# Patient Record
Sex: FEMALE | Race: WHITE | NOT HISPANIC OR LATINO | URBAN - METROPOLITAN AREA
[De-identification: names, ages, dates, MRNs, and addresses within clinical notes are randomized per-mention and may not be internally consistent; named-entity substitution may affect disease eponyms.]

---

## 2017-12-10 ENCOUNTER — EMERGENCY (EMERGENCY)
Facility: HOSPITAL | Age: 72
LOS: 1 days | Discharge: HOME | End: 2017-12-10
Attending: EMERGENCY MEDICINE | Admitting: EMERGENCY MEDICINE
Payer: MEDICARE

## 2017-12-10 VITALS
SYSTOLIC BLOOD PRESSURE: 100 MMHG | OXYGEN SATURATION: 96 % | RESPIRATION RATE: 20 BRPM | DIASTOLIC BLOOD PRESSURE: 65 MMHG | HEART RATE: 54 BPM

## 2017-12-10 VITALS
DIASTOLIC BLOOD PRESSURE: 83 MMHG | OXYGEN SATURATION: 94 % | RESPIRATION RATE: 20 BRPM | TEMPERATURE: 99 F | HEART RATE: 75 BPM | SYSTOLIC BLOOD PRESSURE: 129 MMHG

## 2017-12-10 PROCEDURE — 73110 X-RAY EXAM OF WRIST: CPT

## 2017-12-10 PROCEDURE — 23650 CLTX SHO DSLC W/MNPJ WO ANES: CPT | Mod: 54

## 2017-12-10 PROCEDURE — 96374 THER/PROPH/DIAG INJ IV PUSH: CPT | Mod: XU

## 2017-12-10 PROCEDURE — 99284 EMERGENCY DEPT VISIT MOD MDM: CPT | Mod: 25

## 2017-12-10 PROCEDURE — 93010 ELECTROCARDIOGRAM REPORT: CPT | Mod: 59

## 2017-12-10 PROCEDURE — 73110 X-RAY EXAM OF WRIST: CPT | Mod: 26,RT

## 2017-12-10 PROCEDURE — 73060 X-RAY EXAM OF HUMERUS: CPT | Mod: 26,RT

## 2017-12-10 PROCEDURE — 73030 X-RAY EXAM OF SHOULDER: CPT | Mod: 26,76,RT

## 2017-12-10 PROCEDURE — 96372 THER/PROPH/DIAG INJ SC/IM: CPT | Mod: XU

## 2017-12-10 PROCEDURE — 73060 X-RAY EXAM OF HUMERUS: CPT

## 2017-12-10 PROCEDURE — 71010: CPT | Mod: 26

## 2017-12-10 PROCEDURE — 71045 X-RAY EXAM CHEST 1 VIEW: CPT

## 2017-12-10 PROCEDURE — 73030 X-RAY EXAM OF SHOULDER: CPT

## 2017-12-10 PROCEDURE — 93005 ELECTROCARDIOGRAM TRACING: CPT | Mod: XU

## 2017-12-10 PROCEDURE — 23650 CLTX SHO DSLC W/MNPJ WO ANES: CPT | Mod: RT

## 2017-12-10 RX ORDER — ACETAMINOPHEN 500 MG
1000 TABLET ORAL ONCE
Qty: 0 | Refills: 0 | Status: COMPLETED | OUTPATIENT
Start: 2017-12-10 | End: 2017-12-10

## 2017-12-10 RX ORDER — MORPHINE SULFATE 50 MG/1
4 CAPSULE, EXTENDED RELEASE ORAL ONCE
Qty: 0 | Refills: 0 | Status: DISCONTINUED | OUTPATIENT
Start: 2017-12-10 | End: 2017-12-10

## 2017-12-10 RX ORDER — ONDANSETRON 8 MG/1
4 TABLET, FILM COATED ORAL ONCE
Qty: 0 | Refills: 0 | Status: DISCONTINUED | OUTPATIENT
Start: 2017-12-10 | End: 2017-12-10

## 2017-12-10 RX ORDER — OXYCODONE HYDROCHLORIDE 5 MG/1
5 TABLET ORAL ONCE
Qty: 0 | Refills: 0 | Status: DISCONTINUED | OUTPATIENT
Start: 2017-12-10 | End: 2017-12-10

## 2017-12-10 RX ADMIN — Medication 1000 MILLIGRAM(S): at 11:25

## 2017-12-10 RX ADMIN — MORPHINE SULFATE 4 MILLIGRAM(S): 50 CAPSULE, EXTENDED RELEASE ORAL at 10:54

## 2017-12-10 RX ADMIN — OXYCODONE HYDROCHLORIDE 5 MILLIGRAM(S): 5 TABLET ORAL at 11:24

## 2017-12-10 RX ADMIN — MORPHINE SULFATE 4 MILLIGRAM(S): 50 CAPSULE, EXTENDED RELEASE ORAL at 12:43

## 2017-12-10 NOTE — ED PROVIDER NOTE - CARE PLAN
Principal Discharge DX:	Shoulder dislocation, right, initial encounter  Instructions for follow-up, activity and diet:	Follow up with your medical doctor in 2-3 days or call our clinic at [phone] and state you were seen in the Emergency Department and would like to be seen in clinic.     Take Tylenol 1 g every six hours for at least 2 days then take it as needed for pain    Drink at least 2 Liters or 64 Ounces of water each day (UNLESS you are supposed to restrict fluids or have a history of congestive heart failure (CHF)).    Return for any persistent, worsening symptoms, or ANY concerns at all.  Secondary Diagnosis:	Fall, initial encounter

## 2017-12-10 NOTE — ED ADULT NURSE NOTE - OBJECTIVE STATEMENT
Pt is a 73 yo female who slipped on ice this am while cleaning off her hear, she denies any LOC and denies hitting her, she reports falling onto her right side, she ambulated after initially but is now having pain in her right shoulder. Pt is unable to move right arm due to pain. Pt is having a productive cough x 3-5 days. Pt denies any CP or SOB no N/V/D no fever or chills, no headaches or dizziness. She has pmh of COPD, DM and HTN

## 2017-12-10 NOTE — ED PROVIDER NOTE - PLAN OF CARE
Follow up with your medical doctor in 2-3 days or call our clinic at [phone] and state you were seen in the Emergency Department and would like to be seen in clinic.     Take Tylenol 1 g every six hours for at least 2 days then take it as needed for pain    Drink at least 2 Liters or 64 Ounces of water each day (UNLESS you are supposed to restrict fluids or have a history of congestive heart failure (CHF)).    Return for any persistent, worsening symptoms, or ANY concerns at all.

## 2017-12-10 NOTE — ED PROVIDER NOTE - PHYSICAL EXAMINATION
right shoulder deformity with severe pain ? humeral head deformity, patient cannot range right arm secondary to pain,  mild pain to right wrist, no snuff box tenderness to palpation/pain on axial loading of thumb, severe distress secondary to pain, NCAT, MMM, Trachea midline, Normal conjunctiva, hacking cough noted with wet sounding character, no focal crackles, CTAB, Non-tachycardic, Normal perfusion, Soft, NTND, No rebound/guarding, No edema, No deformity of extremities, Appropriate, Cooperative, With capacity and insight, No rashes, CN grossly intact, Normal coordination, No focal motor or sensory deficits

## 2017-12-10 NOTE — ED PROVIDER NOTE - PROGRESS NOTE DETAILS
The patient was re-examined after interventions and is feeling much better.  The patient will follow up with their primary physician this week.   shoulder reduced without difficulty

## 2017-12-10 NOTE — ED PROVIDER NOTE - OBJECTIVE STATEMENT
Patient with fall at 0900 this am and reports to emergency department with chief complaint of right shoulder pain. Moderate to severe. Persistent. Not better with time. No numbness/tingling.

## 2017-12-13 ENCOUNTER — APPOINTMENT (OUTPATIENT)
Dept: ORTHOPEDIC SURGERY | Facility: CLINIC | Age: 72
End: 2017-12-13
Payer: MEDICARE

## 2017-12-13 VITALS — WEIGHT: 180 LBS | BODY MASS INDEX: 33.13 KG/M2 | HEIGHT: 62 IN

## 2017-12-13 VITALS
HEART RATE: 96 BPM | SYSTOLIC BLOOD PRESSURE: 130 MMHG | WEIGHT: 180 LBS | BODY MASS INDEX: 33.13 KG/M2 | DIASTOLIC BLOOD PRESSURE: 78 MMHG | HEIGHT: 62 IN

## 2017-12-13 DIAGNOSIS — Z78.9 OTHER SPECIFIED HEALTH STATUS: ICD-10-CM

## 2017-12-13 DIAGNOSIS — Z86.79 PERSONAL HISTORY OF OTHER DISEASES OF THE CIRCULATORY SYSTEM: ICD-10-CM

## 2017-12-13 DIAGNOSIS — E83.52 HYPERCALCEMIA: ICD-10-CM

## 2017-12-13 DIAGNOSIS — H26.9 UNSPECIFIED CATARACT: ICD-10-CM

## 2017-12-13 DIAGNOSIS — Z87.891 PERSONAL HISTORY OF NICOTINE DEPENDENCE: ICD-10-CM

## 2017-12-13 DIAGNOSIS — Z60.2 PROBLEMS RELATED TO LIVING ALONE: ICD-10-CM

## 2017-12-13 DIAGNOSIS — H53.2 DIPLOPIA: ICD-10-CM

## 2017-12-13 DIAGNOSIS — E03.2 HYPOTHYROIDISM DUE TO MEDICAMENTS AND OTHER EXOGENOUS SUBSTANCES: ICD-10-CM

## 2017-12-13 PROCEDURE — 99203 OFFICE O/P NEW LOW 30 MIN: CPT

## 2017-12-13 PROCEDURE — 73030 X-RAY EXAM OF SHOULDER: CPT | Mod: RT

## 2017-12-13 RX ORDER — LEVOTHYROXINE SODIUM 0.12 MG/1
125 TABLET ORAL
Qty: 90 | Refills: 0 | Status: ACTIVE | COMMUNITY
Start: 2017-08-04

## 2017-12-13 RX ORDER — LOSARTAN POTASSIUM 100 MG/1
TABLET, FILM COATED ORAL
Refills: 0 | Status: ACTIVE | COMMUNITY

## 2017-12-13 RX ORDER — AMOXICILLIN 875 MG/1
875 TABLET, FILM COATED ORAL
Qty: 14 | Refills: 0 | Status: ACTIVE | COMMUNITY
Start: 2017-12-08

## 2017-12-13 RX ORDER — TOBRAMYCIN AND DEXAMETHASONE 3; 1 MG/ML; MG/ML
0.3-0.1 SUSPENSION/ DROPS OPHTHALMIC
Qty: 5 | Refills: 0 | Status: ACTIVE | COMMUNITY
Start: 2017-10-27

## 2017-12-13 RX ORDER — LOSARTAN POTASSIUM AND HYDROCHLOROTHIAZIDE 12.5; 5 MG/1; MG/1
50-12.5 TABLET ORAL
Qty: 90 | Refills: 0 | Status: ACTIVE | COMMUNITY
Start: 2017-06-29

## 2017-12-13 SDOH — SOCIAL STABILITY - SOCIAL INSECURITY: PROBLEMS RELATED TO LIVING ALONE: Z60.2

## 2017-12-20 ENCOUNTER — APPOINTMENT (OUTPATIENT)
Dept: MRI IMAGING | Facility: CLINIC | Age: 72
End: 2017-12-20
Payer: MEDICARE

## 2017-12-20 ENCOUNTER — OUTPATIENT (OUTPATIENT)
Dept: OUTPATIENT SERVICES | Facility: HOSPITAL | Age: 72
LOS: 1 days | End: 2017-12-20
Payer: MEDICARE

## 2017-12-20 DIAGNOSIS — Z00.8 ENCOUNTER FOR OTHER GENERAL EXAMINATION: ICD-10-CM

## 2017-12-20 PROCEDURE — 73221 MRI JOINT UPR EXTREM W/O DYE: CPT

## 2017-12-20 PROCEDURE — 73221 MRI JOINT UPR EXTREM W/O DYE: CPT | Mod: 26,RT

## 2018-02-14 ENCOUNTER — APPOINTMENT (OUTPATIENT)
Dept: ORTHOPEDIC SURGERY | Facility: CLINIC | Age: 73
End: 2018-02-14
Payer: MEDICARE

## 2018-02-14 DIAGNOSIS — S43.016A ANTERIOR DISLOCATION OF UNSPECIFIED HUMERUS, INITIAL ENCOUNTER: ICD-10-CM

## 2018-02-14 PROCEDURE — 99214 OFFICE O/P EST MOD 30 MIN: CPT | Mod: 25

## 2018-02-14 PROCEDURE — 20610 DRAIN/INJ JOINT/BURSA W/O US: CPT | Mod: RT

## 2018-03-14 ENCOUNTER — APPOINTMENT (OUTPATIENT)
Dept: ORTHOPEDIC SURGERY | Facility: CLINIC | Age: 73
End: 2018-03-14
Payer: MEDICARE

## 2018-03-14 PROCEDURE — 99213 OFFICE O/P EST LOW 20 MIN: CPT

## 2018-05-23 ENCOUNTER — APPOINTMENT (OUTPATIENT)
Dept: ORTHOPEDIC SURGERY | Facility: CLINIC | Age: 73
End: 2018-05-23
Payer: MEDICARE

## 2018-05-23 DIAGNOSIS — Z87.39 PERSONAL HISTORY OF OTHER DISEASES OF THE MUSCULOSKELETAL SYSTEM AND CONNECTIVE TISSUE: ICD-10-CM

## 2018-05-23 PROCEDURE — 99213 OFFICE O/P EST LOW 20 MIN: CPT

## 2018-05-29 PROBLEM — Z87.39 HISTORY OF CLOSED SHOULDER DISLOCATION: Status: ACTIVE | Noted: 2018-03-14

## 2019-04-26 ENCOUNTER — EMERGENCY (EMERGENCY)
Facility: HOSPITAL | Age: 74
LOS: 1 days | Discharge: HOME | End: 2019-04-26
Attending: EMERGENCY MEDICINE
Payer: MEDICARE

## 2019-04-26 VITALS
RESPIRATION RATE: 18 BRPM | HEART RATE: 101 BPM | HEIGHT: 63 IN | SYSTOLIC BLOOD PRESSURE: 124 MMHG | WEIGHT: 179.9 LBS | TEMPERATURE: 98 F | OXYGEN SATURATION: 96 % | DIASTOLIC BLOOD PRESSURE: 81 MMHG

## 2019-04-26 PROBLEM — J44.9 CHRONIC OBSTRUCTIVE PULMONARY DISEASE, UNSPECIFIED: Chronic | Status: ACTIVE | Noted: 2017-12-10

## 2019-04-26 PROBLEM — I10 ESSENTIAL (PRIMARY) HYPERTENSION: Chronic | Status: ACTIVE | Noted: 2017-12-10

## 2019-04-26 PROCEDURE — 99283 EMERGENCY DEPT VISIT LOW MDM: CPT

## 2019-04-26 PROCEDURE — 99282 EMERGENCY DEPT VISIT SF MDM: CPT

## 2019-04-26 NOTE — ED PROVIDER NOTE - NSFOLLOWUPINSTRUCTIONS_ED_ALL_ED_FT
Follow-up as needed with the Plastic Surgeon listed below.  Return to the ER if any additional concerns.

## 2019-04-26 NOTE — ED ADULT NURSE NOTE - NSIMPLEMENTINTERV_GEN_ALL_ED
Implemented All Universal Safety Interventions:  [place] to call system. Call bell, personal items and telephone within reach. Instruct patient to call for assistance. Room bathroom lighting operational. Non-slip footwear when patient is off stretcher. Physically safe environment: no spills, clutter or unnecessary equipment. Stretcher in lowest position, wheels locked, appropriate side rails in place.

## 2019-04-26 NOTE — ED PROVIDER NOTE - OBJECTIVE STATEMENT
74 year-old female p/w tear of left earlobe.  Patient reports changing yesterday when her earing got caught in her clothes and made a rip in her earlobe.  Bleeding initially; now resolved.  Denies pain.  No other trauma/injuries.

## 2019-04-26 NOTE — ED PROVIDER NOTE - CLINICAL SUMMARY MEDICAL DECISION MAKING FREE TEXT BOX
74 year-old female p/w tear of left earlobe - fully healed; discussed outpatient follow-up with plastic surgery for cosmetic improvement (referral provided). Jocelyn: 74 year-old female p/w tear of left earlobe - fully healed; discussed outpatient follow-up with plastic surgery for cosmetic improvement (referral provided).

## 2019-04-26 NOTE — ED PROVIDER NOTE - PHYSICAL EXAMINATION
*Gen: NAD, AAO*3  *HEENT: NC/AT, MMM, TM grey/pearly b/l, airway patent, trachea midline  *CV: RRR, S1/S2 present, no murmurs  *Resp: no respiratory distress, LCTAB, no wheezing  *Abd: non-distended, soft N/Tx4, no guarding or rigidity  *Skin: no rashes, + tear noted from middle to the end of left ear lobe - two edges completely healed, no pain/swelling/redness/bleeding  ~ Fernanda Hall M.D.

## 2019-04-26 NOTE — ED ADULT NURSE NOTE - OBJECTIVE STATEMENT
c/o left earlobe tear since about 8pm last night when her earring became caught on her shirt. Patient states there was minimum bleeding a the time and she applied a bandaid. At this time, no bleeding noted. Wound edges appear healed and clean.

## 2019-06-24 NOTE — ED PROVIDER NOTE - NS_EDPROVIDERDISPOUSERTYPE_ED_A_ED
Appointment is 6-27-19.  
Please keep appointment will discuss labs at the the time of the visit
Attending Attestation (For Attendings USE Only)...

## 2019-11-25 ENCOUNTER — OUTPATIENT (OUTPATIENT)
Dept: OUTPATIENT SERVICES | Facility: HOSPITAL | Age: 74
LOS: 1 days | End: 2019-11-25
Payer: MEDICARE

## 2019-11-25 ENCOUNTER — RESULT REVIEW (OUTPATIENT)
Age: 74
End: 2019-11-25

## 2019-11-25 VITALS
DIASTOLIC BLOOD PRESSURE: 80 MMHG | OXYGEN SATURATION: 97 % | HEART RATE: 79 BPM | SYSTOLIC BLOOD PRESSURE: 152 MMHG | HEIGHT: 60.25 IN | WEIGHT: 216.05 LBS | TEMPERATURE: 98 F | RESPIRATION RATE: 18 BRPM

## 2019-11-25 DIAGNOSIS — Z90.89 ACQUIRED ABSENCE OF OTHER ORGANS: Chronic | ICD-10-CM

## 2019-11-25 DIAGNOSIS — D05.11 INTRADUCTAL CARCINOMA IN SITU OF RIGHT BREAST: ICD-10-CM

## 2019-11-25 DIAGNOSIS — E66.9 OBESITY, UNSPECIFIED: ICD-10-CM

## 2019-11-25 DIAGNOSIS — R92.8 OTHER ABNORMAL AND INCONCLUSIVE FINDINGS ON DIAGNOSTIC IMAGING OF BREAST: ICD-10-CM

## 2019-11-25 DIAGNOSIS — Z80.3 FAMILY HISTORY OF MALIGNANT NEOPLASM OF BREAST: ICD-10-CM

## 2019-11-25 DIAGNOSIS — E03.9 HYPOTHYROIDISM, UNSPECIFIED: ICD-10-CM

## 2019-11-25 DIAGNOSIS — Z17.0 ESTROGEN RECEPTOR POSITIVE STATUS [ER+]: ICD-10-CM

## 2019-11-25 DIAGNOSIS — E78.5 HYPERLIPIDEMIA, UNSPECIFIED: ICD-10-CM

## 2019-11-25 DIAGNOSIS — Z01.818 ENCOUNTER FOR OTHER PREPROCEDURAL EXAMINATION: ICD-10-CM

## 2019-11-25 DIAGNOSIS — I10 ESSENTIAL (PRIMARY) HYPERTENSION: ICD-10-CM

## 2019-11-25 DIAGNOSIS — Z98.51 TUBAL LIGATION STATUS: Chronic | ICD-10-CM

## 2019-11-25 LAB
ANION GAP SERPL CALC-SCNC: 10 MMOL/L — SIGNIFICANT CHANGE UP (ref 5–17)
BUN SERPL-MCNC: 18 MG/DL — SIGNIFICANT CHANGE UP (ref 7–23)
CALCIUM SERPL-MCNC: 9.3 MG/DL — SIGNIFICANT CHANGE UP (ref 8.4–10.5)
CHLORIDE SERPL-SCNC: 104 MMOL/L — SIGNIFICANT CHANGE UP (ref 96–108)
CO2 SERPL-SCNC: 27 MMOL/L — SIGNIFICANT CHANGE UP (ref 22–31)
CREAT SERPL-MCNC: 0.97 MG/DL — SIGNIFICANT CHANGE UP (ref 0.5–1.3)
GLUCOSE SERPL-MCNC: 82 MG/DL — SIGNIFICANT CHANGE UP (ref 70–99)
HCT VFR BLD CALC: 45 % — SIGNIFICANT CHANGE UP (ref 34.5–45)
HGB BLD-MCNC: 14.8 G/DL — SIGNIFICANT CHANGE UP (ref 11.5–15.5)
MCHC RBC-ENTMCNC: 27.9 PG — SIGNIFICANT CHANGE UP (ref 27–34)
MCHC RBC-ENTMCNC: 32.9 GM/DL — SIGNIFICANT CHANGE UP (ref 32–36)
MCV RBC AUTO: 84.9 FL — SIGNIFICANT CHANGE UP (ref 80–100)
NRBC # BLD: 0 /100 WBCS — SIGNIFICANT CHANGE UP (ref 0–0)
PLATELET # BLD AUTO: 415 K/UL — HIGH (ref 150–400)
POTASSIUM SERPL-MCNC: 3.6 MMOL/L — SIGNIFICANT CHANGE UP (ref 3.5–5.3)
POTASSIUM SERPL-SCNC: 3.6 MMOL/L — SIGNIFICANT CHANGE UP (ref 3.5–5.3)
RBC # BLD: 5.3 M/UL — HIGH (ref 3.8–5.2)
RBC # FLD: 14.1 % — SIGNIFICANT CHANGE UP (ref 10.3–14.5)
SODIUM SERPL-SCNC: 141 MMOL/L — SIGNIFICANT CHANGE UP (ref 135–145)
WBC # BLD: 9.99 K/UL — SIGNIFICANT CHANGE UP (ref 3.8–10.5)
WBC # FLD AUTO: 9.99 K/UL — SIGNIFICANT CHANGE UP (ref 3.8–10.5)

## 2019-11-25 PROCEDURE — 36415 COLL VENOUS BLD VENIPUNCTURE: CPT

## 2019-11-25 PROCEDURE — 80048 BASIC METABOLIC PNL TOTAL CA: CPT

## 2019-11-25 PROCEDURE — 93005 ELECTROCARDIOGRAM TRACING: CPT

## 2019-11-25 PROCEDURE — 85027 COMPLETE CBC AUTOMATED: CPT

## 2019-11-25 PROCEDURE — G0463: CPT

## 2019-11-25 PROCEDURE — 88321 CONSLTJ&REPRT SLD PREP ELSWR: CPT

## 2019-11-25 PROCEDURE — 93010 ELECTROCARDIOGRAM REPORT: CPT | Mod: NC

## 2019-11-25 NOTE — H&P PST ADULT - NEGATIVE BREAST SYMPTOMS
no nipple discharge R/no breast lump L/no breast tenderness R/no nipple discharge L/no breast tenderness L

## 2019-11-25 NOTE — H&P PST ADULT - NSICDXPROBLEM_GEN_ALL_CORE_FT
PROBLEM DIAGNOSES  Problem: Intraductal carcinoma in situ of right breast  Assessment and Plan: Pre-op instructions given. Pt verbalized understanding  Pepcid & Chlorhexidine wash instructions  Pending: Medical clearacnce - Abnormal EKG + requested by surgeon    Problem: Hypothyroid  Assessment and Plan: Pt instructed to take meds as prescribed    Problem: Hyperlipidemia  Assessment and Plan: Pt instructed to take meds as prescribed    Problem: Obesity  Assessment and Plan: MERT precaution - stop bang 4    Problem: Hypertension  Assessment and Plan: Pt instructed to take meds as prescribed PROBLEM DIAGNOSES  Problem: Intraductal carcinoma in situ of right breast  Assessment and Plan: Pre-op instructions given. Pt verbalized understanding  Pepcid & Chlorhexidine wash instructions  Pending: Medical clearance - Abnormal EKG + requested by surgeon    Problem: Hypothyroid  Assessment and Plan: Pt instructed to take meds as prescribed    Problem: Hyperlipidemia  Assessment and Plan: Pt instructed to take meds as prescribed    Problem: Obesity  Assessment and Plan: MERT precaution - stop bang 4    Problem: Hypertension  Assessment and Plan: Pt instructed to take meds as prescribed

## 2019-11-25 NOTE — H&P PST ADULT - ASSESSMENT
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 Baum; ROS is unchanged since medical clearance by PMD and PST H&P.  VSS, afebrile  alert, anxious, oriented x 3  HEENT: unremarkable  neck: supple  lungs: clear bilat  breast: deferred to Dr. Baum's exam  cor: [id number] @ 72; no murmur appreciated  abd: obese, +BS, soft, non-tender  ext: no C,C or E; feet warm, pulses palp; no calf tenderness; sensory/motor intact grossly; speech fluent    A: right breast cancer      HTN, hypothyroid  P: To OR as per Dr. Sarika Hdz, RPA-C

## 2019-11-25 NOTE — H&P PST ADULT - VENOUS THROMBOEMBOLISM CURRENT STATUS
(1) other risk factor (includes escalating BMI, pack-years of smoking, diabetes requiring insulin, chemotherapy, female gender and length of surgery)/(1) varicose veins/(2) malignancy (present or previous)

## 2019-11-25 NOTE — H&P PST ADULT - NSANTHOSAYNRD_GEN_A_CORE
neck 17inches/No. MERT screening performed.  STOP BANG Legend: 0-2 = LOW Risk; 3-4 = INTERMEDIATE Risk; 5-8 = HIGH Risk

## 2019-11-25 NOTE — H&P PST ADULT - NEGATIVE CARDIOVASCULAR SYMPTOMS
no palpitations/no dyspnea on exertion/no chest pain/no orthopnea/no paroxysmal nocturnal dyspnea/no claudication/no peripheral edema

## 2019-11-25 NOTE — H&P PST ADULT - HISTORY OF PRESENT ILLNESS
75yo female with medical h/o Hypothyroid and HTN, reports annual mammogram/breast US showed abnormal findings, and reports biopsy indicate Intraductal carcinoma in situ of right breast. Pt presents today for PST for Right Breast Wide Resection w/Mady  scheduled for 12/5/2019

## 2019-11-27 ENCOUNTER — OUTPATIENT (OUTPATIENT)
Dept: OUTPATIENT SERVICES | Facility: HOSPITAL | Age: 74
LOS: 1 days | End: 2019-11-27
Payer: MEDICARE

## 2019-11-27 ENCOUNTER — APPOINTMENT (OUTPATIENT)
Dept: MAMMOGRAPHY | Facility: IMAGING CENTER | Age: 74
End: 2019-11-27
Payer: MEDICARE

## 2019-11-27 DIAGNOSIS — Z98.51 TUBAL LIGATION STATUS: Chronic | ICD-10-CM

## 2019-11-27 DIAGNOSIS — Z00.8 ENCOUNTER FOR OTHER GENERAL EXAMINATION: ICD-10-CM

## 2019-11-27 DIAGNOSIS — Z90.89 ACQUIRED ABSENCE OF OTHER ORGANS: Chronic | ICD-10-CM

## 2019-11-27 PROBLEM — E66.9 OBESITY, UNSPECIFIED: Chronic | Status: ACTIVE | Noted: 2019-11-25

## 2019-11-27 PROBLEM — E03.9 HYPOTHYROIDISM, UNSPECIFIED: Chronic | Status: ACTIVE | Noted: 2019-11-25

## 2019-11-27 LAB — SURGICAL PATHOLOGY STUDY: SIGNIFICANT CHANGE UP

## 2019-11-27 PROCEDURE — 19281 PERQ DEVICE BREAST 1ST IMAG: CPT | Mod: RT

## 2019-11-27 PROCEDURE — C1739: CPT

## 2019-11-27 PROCEDURE — 19281 PERQ DEVICE BREAST 1ST IMAG: CPT

## 2019-12-04 ENCOUNTER — TRANSCRIPTION ENCOUNTER (OUTPATIENT)
Age: 74
End: 2019-12-04

## 2019-12-05 ENCOUNTER — OUTPATIENT (OUTPATIENT)
Dept: OUTPATIENT SERVICES | Facility: HOSPITAL | Age: 74
LOS: 1 days | End: 2019-12-05
Payer: MEDICARE

## 2019-12-05 ENCOUNTER — RESULT REVIEW (OUTPATIENT)
Age: 74
End: 2019-12-05

## 2019-12-05 VITALS
RESPIRATION RATE: 13 BRPM | DIASTOLIC BLOOD PRESSURE: 79 MMHG | HEART RATE: 79 BPM | OXYGEN SATURATION: 96 % | SYSTOLIC BLOOD PRESSURE: 128 MMHG | TEMPERATURE: 98 F | HEIGHT: 60.25 IN | WEIGHT: 216.05 LBS

## 2019-12-05 VITALS
TEMPERATURE: 98 F | DIASTOLIC BLOOD PRESSURE: 79 MMHG | SYSTOLIC BLOOD PRESSURE: 128 MMHG | OXYGEN SATURATION: 96 % | RESPIRATION RATE: 16 BRPM | HEART RATE: 79 BPM

## 2019-12-05 DIAGNOSIS — Z98.51 TUBAL LIGATION STATUS: Chronic | ICD-10-CM

## 2019-12-05 DIAGNOSIS — Z17.0 ESTROGEN RECEPTOR POSITIVE STATUS [ER+]: ICD-10-CM

## 2019-12-05 DIAGNOSIS — D05.11 INTRADUCTAL CARCINOMA IN SITU OF RIGHT BREAST: ICD-10-CM

## 2019-12-05 DIAGNOSIS — Z90.89 ACQUIRED ABSENCE OF OTHER ORGANS: Chronic | ICD-10-CM

## 2019-12-05 DIAGNOSIS — R92.0 MAMMOGRAPHIC MICROCALCIFICATION FOUND ON DIAGNOSTIC IMAGING OF BREAST: ICD-10-CM

## 2019-12-05 DIAGNOSIS — Z80.3 FAMILY HISTORY OF MALIGNANT NEOPLASM OF BREAST: ICD-10-CM

## 2019-12-05 PROCEDURE — 76098 X-RAY EXAM SURGICAL SPECIMEN: CPT | Mod: 26

## 2019-12-05 PROCEDURE — 76098 X-RAY EXAM SURGICAL SPECIMEN: CPT

## 2019-12-05 PROCEDURE — 19301 PARTIAL MASTECTOMY: CPT | Mod: AS,RT

## 2019-12-05 PROCEDURE — 88307 TISSUE EXAM BY PATHOLOGIST: CPT | Mod: 26

## 2019-12-05 PROCEDURE — 88307 TISSUE EXAM BY PATHOLOGIST: CPT

## 2019-12-05 PROCEDURE — 88305 TISSUE EXAM BY PATHOLOGIST: CPT

## 2019-12-05 PROCEDURE — 14301 TIS TRNFR ANY 30.1-60 SQ CM: CPT | Mod: AS

## 2019-12-05 PROCEDURE — 88305 TISSUE EXAM BY PATHOLOGIST: CPT | Mod: 26

## 2019-12-05 PROCEDURE — 19301 PARTIAL MASTECTOMY: CPT | Mod: RT

## 2019-12-05 RX ORDER — SODIUM CHLORIDE 9 MG/ML
1000 INJECTION, SOLUTION INTRAVENOUS
Refills: 0 | Status: DISCONTINUED | OUTPATIENT
Start: 2019-12-05 | End: 2019-12-05

## 2019-12-05 RX ORDER — ASPIRIN/CALCIUM CARB/MAGNESIUM 324 MG
81 TABLET ORAL
Qty: 0 | Refills: 0 | DISCHARGE

## 2019-12-05 RX ORDER — OXYCODONE HYDROCHLORIDE 5 MG/1
5 TABLET ORAL EVERY 6 HOURS
Refills: 0 | Status: DISCONTINUED | OUTPATIENT
Start: 2019-12-05 | End: 2019-12-05

## 2019-12-05 RX ORDER — ONDANSETRON 8 MG/1
4 TABLET, FILM COATED ORAL ONCE
Refills: 0 | Status: DISCONTINUED | OUTPATIENT
Start: 2019-12-05 | End: 2019-12-05

## 2019-12-05 RX ORDER — ONDANSETRON 8 MG/1
4 TABLET, FILM COATED ORAL EVERY 6 HOURS
Refills: 0 | Status: DISCONTINUED | OUTPATIENT
Start: 2019-12-05 | End: 2019-12-28

## 2019-12-05 RX ORDER — HYDROMORPHONE HYDROCHLORIDE 2 MG/ML
0.5 INJECTION INTRAMUSCULAR; INTRAVENOUS; SUBCUTANEOUS ONCE
Refills: 0 | Status: DISCONTINUED | OUTPATIENT
Start: 2019-12-05 | End: 2019-12-05

## 2019-12-05 RX ADMIN — SODIUM CHLORIDE 50 MILLILITER(S): 9 INJECTION, SOLUTION INTRAVENOUS at 06:33

## 2019-12-05 NOTE — ASU DISCHARGE PLAN (ADULT/PEDIATRIC) - NURSING INSTRUCTIONS
all discharge,safety,follow up care to MD. Follow up with MD as indicated above. Take pain medication with a food item and not on an empty stomach. Rest at home drink plenty of fluids. Eat lightly today avoid heavy and spicy foods. all discharge,safety,follow up care to MD. Follow up with MD as indicated above. Take pain medication with a food item and not on an empty stomach. Rest at home drink plenty of fluids. Eat lightly today avoid heavy and spicy foods. After 4 days you may take binderbra and dressing off. You may shower afterwards. Put bra on and binder keep on for a total of 2 weeks after surgery. Follow up with Dr Baum in 2 weeks. Resume aspirin in1 weeks.

## 2019-12-05 NOTE — ASU DISCHARGE PLAN (ADULT/PEDIATRIC) - PAIN MANAGEMENT
Prescriptions electronically submitted to pharmacy from doctor's office Prescriptions electronically submitted to pharmacy from doctor's office/Prescription given to patient/guardian

## 2019-12-05 NOTE — ASU DISCHARGE PLAN (ADULT/PEDIATRIC) - CALL YOUR DOCTOR IF YOU HAVE ANY OF THE FOLLOWING:
Wound/Surgical Site with redness, or foul smelling discharge or pus Nausea and vomiting that does not stop/Wound/Surgical Site with redness, or foul smelling discharge or pus/Swelling that gets worse/Fever greater than (need to indicate Fahrenheit or Celsius)/Inability to tolerate liquids or foods/Bleeding that does not stop/Pain not relieved by Medications

## 2019-12-05 NOTE — ASU DISCHARGE PLAN (ADULT/PEDIATRIC) - ASU DC SPECIAL INSTRUCTIONSFT
PLEASE KEEP BINDER, BRA AND DRESSING ON FOR 4 DAYS, YOU MAY SPONGE BATHE, NO SHOWER    AFTER 4 DAYS YOU MAKE TAKE BINDER, BRA AND DRESSING OFF, YOU MAY SHOWER, AFTERWARDS PLEASE PUT ON THE BRA AND BINDER BACK ON FOR A TOTAL OF TWO WEEKS AFTER SURGERY     PLEASE FOLLOW UP WITH DR. LEES IN 2 WEEKS     YOU CAN RESUME TAKING ASPIRIN IN 1 WEEK     TAKE PAIN MEDICATION AS NEEDED {"itemType":"maillistrow","tableViewId":"folderId:[id number]=;listViewType:0;viewFilter:All;focusedViewFilter:0;inboxClassificationFilter: 1;sortColumn:1;sortDirection:Descending","tableListViewType":0,"rowKeys":["[id number]="],"subjects":["[facility] Daily Brief 12-3-19"],"latestItemIds":["[id number]="],"sizes":[[id number]]}PLEASE KEEP BINDER, BRA AND DRESSING ON FOR 4 DAYS, YOU MAY SPONGE BATHE, NO SHOWER    AFTER 4 DAYS YOU MAKE TAKE BINDER, BRA AND DRESSING OFF, YOU MAY SHOWER, AFTERWARDS PLEASE PUT ON THE BRA AND BINDER BACK ON FOR A TOTAL OF TWO WEEKS AFTER SURGERY     PLEASE FOLLOW UP WITH DR. LEES IN 2 WEEKS     YOU CAN RESUME TAKING ASPIRIN IN 1 WEEK     TAKE PAIN MEDICATION AS NEEDED

## 2019-12-05 NOTE — ASU DISCHARGE PLAN (ADULT/PEDIATRIC) - CARE PROVIDER_API CALL
Ale Baum)  Surgery  [address] [facility] [address] [place] [id number]  Phone: [phone]  Fax: [phone]  Follow Up Time:

## 2019-12-06 LAB — SURGICAL PATHOLOGY STUDY: SIGNIFICANT CHANGE UP

## 2020-01-09 ENCOUNTER — TRANSCRIPTION ENCOUNTER (OUTPATIENT)
Age: 75
End: 2020-01-09

## 2020-01-10 ENCOUNTER — OUTPATIENT (OUTPATIENT)
Dept: OUTPATIENT SERVICES | Facility: HOSPITAL | Age: 75
LOS: 1 days | End: 2020-01-10
Payer: MEDICARE

## 2020-01-10 ENCOUNTER — RESULT REVIEW (OUTPATIENT)
Age: 75
End: 2020-01-10

## 2020-01-10 VITALS
OXYGEN SATURATION: 96 % | HEART RATE: 77 BPM | RESPIRATION RATE: 16 BRPM | TEMPERATURE: 97 F | SYSTOLIC BLOOD PRESSURE: 128 MMHG | DIASTOLIC BLOOD PRESSURE: 80 MMHG

## 2020-01-10 VITALS
TEMPERATURE: 98 F | SYSTOLIC BLOOD PRESSURE: 136 MMHG | OXYGEN SATURATION: 96 % | RESPIRATION RATE: 18 BRPM | WEIGHT: 216.05 LBS | HEIGHT: 60.25 IN | DIASTOLIC BLOOD PRESSURE: 83 MMHG | HEART RATE: 72 BPM

## 2020-01-10 DIAGNOSIS — R92.8 OTHER ABNORMAL AND INCONCLUSIVE FINDINGS ON DIAGNOSTIC IMAGING OF BREAST: ICD-10-CM

## 2020-01-10 DIAGNOSIS — D05.11 INTRADUCTAL CARCINOMA IN SITU OF RIGHT BREAST: ICD-10-CM

## 2020-01-10 DIAGNOSIS — Z98.51 TUBAL LIGATION STATUS: Chronic | ICD-10-CM

## 2020-01-10 DIAGNOSIS — Z90.89 ACQUIRED ABSENCE OF OTHER ORGANS: Chronic | ICD-10-CM

## 2020-01-10 DIAGNOSIS — Z17.0 ESTROGEN RECEPTOR POSITIVE STATUS [ER+]: ICD-10-CM

## 2020-01-10 PROCEDURE — 19301 PARTIAL MASTECTOMY: CPT | Mod: AS,78,RT

## 2020-01-10 PROCEDURE — 19301 PARTIAL MASTECTOMY: CPT | Mod: 58,RT

## 2020-01-10 PROCEDURE — 88307 TISSUE EXAM BY PATHOLOGIST: CPT | Mod: 26

## 2020-01-10 PROCEDURE — 88307 TISSUE EXAM BY PATHOLOGIST: CPT

## 2020-01-10 PROCEDURE — 14301 TIS TRNFR ANY 30.1-60 SQ CM: CPT | Mod: AS,78

## 2020-01-10 RX ORDER — HYDROMORPHONE HYDROCHLORIDE 2 MG/ML
0.5 INJECTION INTRAMUSCULAR; INTRAVENOUS; SUBCUTANEOUS ONCE
Refills: 0 | Status: DISCONTINUED | OUTPATIENT
Start: 2020-01-10 | End: 2020-01-10

## 2020-01-10 RX ORDER — SODIUM CHLORIDE 9 MG/ML
1000 INJECTION, SOLUTION INTRAVENOUS
Refills: 0 | Status: DISCONTINUED | OUTPATIENT
Start: 2020-01-10 | End: 2020-01-10

## 2020-01-10 RX ORDER — LEVOTHYROXINE SODIUM 125 MCG
1 TABLET ORAL
Qty: 0 | Refills: 0 | DISCHARGE

## 2020-01-10 RX ORDER — LOSARTAN POTASSIUM 100 MG/1
12.5 TABLET, FILM COATED ORAL
Qty: 0 | Refills: 0 | DISCHARGE

## 2020-01-10 RX ORDER — ASPIRIN/CALCIUM CARB/MAGNESIUM 324 MG
1 TABLET ORAL
Qty: 0 | Refills: 0 | DISCHARGE

## 2020-01-10 RX ORDER — ONDANSETRON 8 MG/1
4 TABLET, FILM COATED ORAL ONCE
Refills: 0 | Status: DISCONTINUED | OUTPATIENT
Start: 2020-01-10 | End: 2020-01-10

## 2020-01-10 RX ORDER — MULTIVIT-MIN/FERROUS GLUCONATE 9 MG/15 ML
1 LIQUID (ML) ORAL
Qty: 0 | Refills: 0 | DISCHARGE

## 2020-01-10 RX ADMIN — HYDROMORPHONE HYDROCHLORIDE 0.5 MILLIGRAM(S): 2 INJECTION INTRAMUSCULAR; INTRAVENOUS; SUBCUTANEOUS at 09:22

## 2020-01-10 RX ADMIN — HYDROMORPHONE HYDROCHLORIDE 0.5 MILLIGRAM(S): 2 INJECTION INTRAMUSCULAR; INTRAVENOUS; SUBCUTANEOUS at 09:10

## 2020-01-10 NOTE — ASU DISCHARGE PLAN (ADULT/PEDIATRIC) - ASU DC SPECIAL INSTRUCTIONSFT
Keep dressings on for 4 days.  Then remove dressings and may shower.  Then replace dressings and bra .  Wear bra day and night for 2 weeks  Drink plenty of fluids and take a stool softener to avoid constipation if taking narcotics   Follow up with Dr Baum in 2 weeks call office for appointment

## 2020-01-10 NOTE — ASU DISCHARGE PLAN (ADULT/PEDIATRIC) - CARE PROVIDER_API CALL
Ale Baum)  Surgery  [address] [facility] [address] [place] [address]  Phone: [phone]  Fax: [phone]  Follow Up Time: 2 weeks

## 2020-01-10 NOTE — ASU DISCHARGE PLAN (ADULT/PEDIATRIC) - CALL YOUR DOCTOR IF YOU HAVE ANY OF THE FOLLOWING:
Bleeding that does not stop/Pain not relieved by Medications/Fever greater than (need to indicate Fahrenheit or Celsius) Inability to tolerate liquids or foods/Bleeding that does not stop/Nausea and vomiting that does not stop/Pain not relieved by Medications/Wound/Surgical Site with redness, or foul smelling discharge or pus/Fever greater than (need to indicate Fahrenheit or Celsius)

## 2020-01-10 NOTE — ASU DISCHARGE PLAN (ADULT/PEDIATRIC) - NURSING INSTRUCTIONS
all discharge safety follow up care to MD. Review surgeons orders as documented above. Eat lightly today avoid heavy and or spicy foods. Rest at home drink plenty of fluids. Take all pain medication with a food item and not on an empty stomach.

## 2020-01-13 LAB — SURGICAL PATHOLOGY STUDY: SIGNIFICANT CHANGE UP

## 2020-01-17 RX ORDER — ASPIRIN/CALCIUM CARB/MAGNESIUM 324 MG
1 TABLET ORAL
Qty: 0 | Refills: 0 | DISCHARGE
Start: 2020-01-17

## 2021-05-26 ENCOUNTER — APPOINTMENT (OUTPATIENT)
Dept: ULTRASOUND IMAGING | Facility: CLINIC | Age: 76
End: 2021-05-26
Payer: MEDICARE

## 2021-05-26 PROCEDURE — 76856 US EXAM PELVIC COMPLETE: CPT

## 2021-05-26 PROCEDURE — 76830 TRANSVAGINAL US NON-OB: CPT

## 2022-01-26 ENCOUNTER — APPOINTMENT (OUTPATIENT)
Dept: ULTRASOUND IMAGING | Facility: CLINIC | Age: 77
End: 2022-01-26
Payer: MEDICARE

## 2022-01-26 PROCEDURE — 76830 TRANSVAGINAL US NON-OB: CPT

## 2022-01-26 PROCEDURE — 76856 US EXAM PELVIC COMPLETE: CPT

## 2023-01-19 ENCOUNTER — APPOINTMENT (OUTPATIENT)
Dept: ULTRASOUND IMAGING | Facility: CLINIC | Age: 78
End: 2023-01-19
Payer: MEDICARE

## 2023-01-19 PROCEDURE — 76856 US EXAM PELVIC COMPLETE: CPT

## 2023-01-19 PROCEDURE — 76830 TRANSVAGINAL US NON-OB: CPT
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
## 2023-12-12 NOTE — H&P PST ADULT - ANESTHESIA, PREVIOUS REACTION, PROFILE
Annual Women Exam     Chief Complaint   Patient presents with    Annual Exam     Annual with pap, review lab results. Pt would like to rule out graves disease. Pt has new intimate partner, please add in additional testing to PAP for STDs.        Antoinette Roberts is a 50 year old female who presents for her annual well woman exam with Pap smear.  Patient has given consent to record this visit for documentation in their clinical record.    HPI  Historian: Self  Due for Influenza and T-dap vaccine.    Due for colon cancer screening.     She had a new intimate partner so needs Pap smear along with STD testing.    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 She has a history of chronic hypothyroidism, her last TSH level was much improved. Denies increased Polyphagia, polydipsia, polyuria, increased heat or cold intolerance, constipation or diarrhea. She gained 4 pounds weight from last visit. She has no issues with appetite and occasionally feels tired.    She had severe hair fall in the last 2-3 weeks which got better as her stress gets relieved. She is going to consult an Ophthalmologist.    She has a scheduled mammogram appointment today.    She still has sleep difficulties and needs refills for her medication.    Her previous Urine, Bacterial Culture reported findings of small amount of occult blood, large leukocyte esterase which was done to screen for UTI. She is still having some UTI symptoms.    Her depression is well controlled under medication with plenty of interest and pleasure in doing things. Not feeling down/depressed.     Denies chest pain, shortness of breath, palpitations.    GYNE Hx:   Patient's last menstrual period was 11/30/2023 (exact date).  Menses:regular, with moderate flow   Current contraception:  intrauterine device  Sexually Active: yes  Hx STD: No  Pap Smear: Last done on Negative for intraepithelial lesion or malignancy  History of  abnormal Pap smear: No  History of abnormal mammogram: No  Family history of breast cancer: No  Family history of uterine or ovarian cancer: No    OB History   No obstetric history on file.      Past Medical History:   Diagnosis Date    Allergy     seeing dr. philippe, restarted allergy shots again    Herpes     Hypothyroidism      Past Surgical History:   Procedure Laterality Date    Breast enhancement surgery      Hematoma evacuation       Family History   Problem Relation Age of Onset    Diabetes Mother     Heart disease Mother       Social History     Tobacco Use    Smoking status: Never    Smokeless tobacco: Never   Vaping Use    Vaping Use: never used   Substance Use Topics    Alcohol use: Yes     Comment: socially    Drug use: Never      ALLERGIES:  Patient has no known allergies.     EXERCISE:  Does patient exercise? Yes  Treadmill 0 -2 days  Was counseling given: Yes     DEPRESSION:  Recent PHQ 2/9 Score    PHQ 2:  PHQ 2 Score Adult PHQ 2 Score Adult PHQ 2 Interpretation Little interest or pleasure in activity?   12/11/2023   1:33 PM 0 No further screening needed 0       PHQ 9:  PHQ 9 Score Adult PHQ 9 Score Adult PHQ 9 Interpretation   12/11/2023   1:33 PM 2 Minimal Depression        Review of Systems  Constitutional: As Per HPI.  Respiratory: As Per HPI.  Cardiovascular: As Per HPI.  Gastrointestinal: As Per HPI.  Genitourinary: As Per HPI.  Psychiatric/Behavioral: As Per HPI.      Recent PHQ 2/9 Score    PHQ 2:  PHQ 2 Score Adult PHQ 2 Score Adult PHQ 2 Interpretation Little interest or pleasure in activity?   12/11/2023   1:33 PM 0 No further screening needed 0       PHQ 9:  PHQ 9 Score Adult PHQ 9 Score Adult PHQ 9 Interpretation   12/11/2023   1:33 PM 2 Minimal Depression     PHQ-2/9 Depression Screening  Little interest or pleasure in activity?: Not at all  Feeling down, depressed or hopeless?: Not at all  Initial depression screening score:: 0  PHQ2 Interpretation: No further screening needed  Trouble  falling or staying asleep or sleeping all the time?: Several days  Feeling tired or having little energy?: Several days  Poor appetite or overeating?: Not at all  Feeling bad about yourself or that you are a failure or have let yourself or family down?: Not at all  Trouble concentrating on things such as reading the newspaper or watching TV?: Not at all  Moving or speaking slowly that other people have noticed or the opposite - being so fidgety or restless that you have been moving around a lot more than usual?: Not at all  Thoughts that you would be better off dead or of hurting yourself in some way?: Not at all  Total depressive symptoms score (PHQ9): : 2  PHQ9 Interpretation: Minimal Depression  If you reported any problems, how difficult have these problems made it to do your work, take care of things at home, or get along with other people?: Not difficult at all     Visit Vitals  /78 (BP Location: LUE - Left upper extremity, Patient Position: Sitting, Cuff Size: Regular)   Pulse 72   Resp 18   Ht 5' 4.3\"   Wt 69.6 kg (153 lb 5.3 oz)   LMP 11/30/2023 (Exact Date)   SpO2 99%   BMI 26.07 kg/m²     PHYSICAL EXAM  Physical Exam  General exam: Patient is an NAD.  A/Ox3   Skin: no rashes or suspicious skin lesions noted.  Neck: supple, no adenopathy or masses noted in neck or supraclavicular regions.  Thyroid is not enlarged.   Heart/Lungs: Chest is clear. Heart is RRR w/out murmur.    Breast Exam: breasts symmetric, no dominant or suspicious mass, no skin or nipple changes, no axillary adenopathy, and self exam is taught and encouraged bilaterally.  Abdomen: soft without masses, organomegaly or tenderness. No distension, rebound or guarding.  Gyn Exam: IUD strings are in place.  Normal external genitalia, no fullness,  (speculum and bimanual):  External Genitalia: Normal appearance. No lesions noted.  Urethral Meatus: Normal size and location, no lesions or prolapse.  Urethra: No masses, tenderness or  scarring  Bladder: No fullness, masses or tenderness  Vagina: Normal general appearance, no discharge or lesions.    Cervix: Normal appearance without lesions or discharge.  Uterus: Normal size, contour, position, mobility, and support.  No tenderness.  Adnexa/Parametria:  No masses, tenderness, organomegaly or nodularity noted.    Labs:  Results for orders placed or performed in visit on 12/11/23   POCT Urine Dip Auto   Result Value    POCT Color Yellow    POCT Appearance Clear    POCT Glucose Urine Negative    POCT Bilirubin Negative    POCT Ketones Negative    POCT Specific Gravity >= 1.030 (A)    POCT Occult Blood Trace - Intact (A)    POCT pH 5.5    POCT Protein 30 mg/dL (A)    POCT Urobilinogen 0.2    Urine Nitrite Negative    WBC (Leukocyte) Esterase POC Trace (A)      No results found for: \"THINHPVRPT\"  Office Visit on 12/11/2023   Component Date Value Ref Range Status    POCT Color 12/11/2023 Yellow   Final    POCT Appearance 12/11/2023 Clear   Final    POCT Glucose Urine 12/11/2023 Negative  Negative mg/dL Final    POCT Bilirubin 12/11/2023 Negative  Negative Final    POCT Ketones 12/11/2023 Negative  Negative, 5 mg/dL, 160 mg/dL mg/dL Final    POCT Specific Gravity 12/11/2023 >= 1.030 (A)  1.000, 1.005, 1.010, 1.015, 1.020, 1.025, 1.030, <= 1.005 Final    POCT Occult Blood 12/11/2023 Trace - Intact (A)  Negative Final    POCT pH 12/11/2023 5.5  5.0, 5.5, 6.0, 6.5, 7.0, 7.5, 8.0, 8.5 Final    POCT Protein 12/11/2023 30 mg/dL (A)  Negative mg/dL Final    POCT Urobilinogen 12/11/2023 0.2  0.2, 1.0 mg/dL Final    Urine Nitrite 12/11/2023 Negative  Negative Final    WBC (Leukocyte) Esterase POC 12/11/2023 Trace (A)  Negative Final       ASSESSMENT & PLAN  Problem List Items Addressed This Visit       Hypothyroidism     Other Visit Diagnoses       Encounter for general adult medical examination w/o abnormal findings    -  Primary    Screening for colon cancer        Relevant Orders    Occult Blood - iFOB (aka  FIT)    UTI symptoms        Relevant Orders    POCT Urine Dip Auto (Completed)    Urine, Bacterial Culture    Encounter for screening mammogram for breast cancer        Sleep difficulties        Relevant Medications    traZODone (DESYREL) 50 MG tablet    Screening for cervical cancer        Relevant Orders    Pap Test    Screen for STD (sexually transmitted disease)        Relevant Orders    Pap Test    Immunization due        Relevant Orders    INFLUENZA QUADRIVALENT SPLIT PRES FREE 0.5 ML VACC, IM (FLULAVAL,FLUARIX,FLUZONE) (Completed)    TETANUS DIPHTHERIA ACELLULAR PERTUSSIS VACC, 10+ YRS (BOOSTRIX) (Completed)          Orders Placed This Encounter    INFLUENZA QUADRIVALENT SPLIT PRES FREE 0.5 ML VACC, IM (FLULAVAL,FLUARIX,FLUZONE)    TETANUS DIPHTHERIA ACELLULAR PERTUSSIS VACC, 10+ YRS (BOOSTRIX)    Occult Blood - iFOB (aka FIT)    POCT Urine Dip Auto    Pap Test    Urine, Bacterial Culture    traZODone (DESYREL) 50 MG tablet     Plan:   Encounter for general adult medical examination w/o abnormal findings  Continue present management.    Screening for colon cancer  Ordered Occult Blood - iFOB (aka FIT); Future    UTI symptoms  Ordered and obtained Urine Dip Auto  Ordered Urine, Bacterial Culture  Continue present management.    Encounter for screening mammogram for breast cancer  Advised getting breast cancer screening every 1 year.    Acquired hypothyroidism  Continue present management.    Sleep difficulties  Refills provided Trazodone (DESYREL) 50 MG tablet; Take 1 tablet by mouth at bedtime as needed for Sleep.  Dispense: 15 tablet; Refill: 3  Continue present management.    Screening for cervical cancer/Screen for STD (sexually transmitted disease)  Ordered Pap Test    Immunization due  Ordered and administered INFLUENZA QUADRIVALENT SPLIT PRES FREE 0.5 ML VACC, IM (FLULAVAL,FLUARIX,FLUZONE)  Ordered and administered TETANUS DIPHTHERIA ACELLULAR PERTUSSIS VACC, 10+ YRS (BOOSTRIX)    Follow up as  needed    Counseled patient for healthy eating and exercise.   Pap smear done today  According to ASCCP guidelines, HPV DNA probe done as adjunct to Pap Smear in women 29yo and older.  If Pap and HPV screen are both NEG, no Pap Smear is due again for 3 years.   Safe sex practices discussed   All questions answered   No follow-ups on file.      Refer to orders.  Medical compliance with plan discussed and risks of non-compliance reviewed.  Patient education completed on disease process, etiology & prognosis.  Proper usage and side effects of medications reviewed & discussed.  Return to clinic as clinically indicated as discussed with patient who verbalized understanding of the plan and is in agreement with the plan.    ISara, have created a visit summary document based on the audio recording between Dr. Jayshree Hanna MD and this patient for the physician to review, edit as needed, and authenticate.    Creation Date: 12/12/2023               none/unknown [email or web address]

## 2024-02-09 NOTE — ASU PATIENT PROFILE, ADULT - HEALTH/HEALTHCARE ANXIETIES, PROFILE
[facility] - PODIATRY    Today's Date: 02/09/24    Patient Name: Shanthi Akers  MRN: [id number]  CSN: [id number]  PCP: Romeo Mukherjee MD,   Referring Provider: Jigna Saul,*    SUBJECTIVE     Chief Complaint   Patient presents with    Left Ankle - Establish Care     Walking into work on Monday, 2/5/24, stepped on a rock and rolled ankle   [facility] 2/7/24, xray on chart   Pt wearing cam walker      HPI: Shanthi Akers, a 63 y.o.female, presents to clinic.    Patient is a 63-year-old female presenting with left ankle pain.  Patient states that she twisted her ankle on 2/5/2024 patient states she went to urgent care and they diagnosed her with a break in her ankle.  Patient states that it has been swollen and painful.  Patient states she is here for further treatment.    Past Medical History:   Diagnosis Date    Ankle pain, left     Asthma     Broken bones     Chronic allergic rhinitis     Depression with anxiety     Essential hypertension     GERD (gastroesophageal reflux disease)     Hyperlipidemia     Migraine headache     Numbness and tingling of leg     right thigh    Psychiatric disorder     Seasonal allergies     Shortness of breath     Sinus trouble      Past Surgical History:   Procedure Laterality Date    BACK SURGERY      COLON SURGERY      COLONOSCOPY N/A 04/12/2022    Procedure: COLONOSCOPY;  Surgeon: Ishmael Ramirez MD;  Location: [facility] ENDOSCOPY;  Service: General;  Laterality: N/A;  NORMAL COLONOSCOPY    ENDOMETRIAL ABLATION      ENDOSCOPY      ENDOSCOPY N/A 04/12/2022    Procedure: ESOPHAGOGASTRODUODENOSCOPY WITH BIOPSIES;  Surgeon: Ishmael Ramirez MD;  Location: [facility] ENDOSCOPY;  Service: General;  Laterality: N/A;  HISTORY CONTEH'S ESOPHAGUS    HAMMER TOE REPAIR Left     CHI'S NEUROMA EXCISION Right      Family History   Problem Relation Age of Onset    Heart failure Mother     Hyperlipidemia Mother     Hypertension Mother     Rheumatic fever Mother     Stroke  Mother     Heart attack Mother     Melanoma Brother     Malig Hyperthermia Neg Hx      Social History     Socioeconomic History    Marital status:    Tobacco Use    Smoking status: Never     Passive exposure: Never    Smokeless tobacco: Never   Vaping Use    Vaping Use: Never used   Substance and Sexual Activity    Alcohol use: Not Currently     Comment: occ wine    Drug use: Never    Sexual activity: Defer     Allergies   Allergen Reactions    Metronidazole Hives and Itching    Tetracycline Other (See Comments) and Rash     Skin peeling on palms and feet       Current Outpatient Medications   Medication Sig Dispense Refill    Advair Diskus 500-50 MCG/ACT DISKUS INHALE 1 PUFF TWICE A DAY 60 each 0    albuterol (ACCUNEB) 1.25 MG/3ML nebulizer solution Take 3 mL by nebulization Every 4 (Four) Hours As Needed for Wheezing or Shortness of Air (And cough). 20 each 0    aspirin 81 MG EC tablet Take 1 tablet by mouth Daily.      atorvastatin (LIPITOR) 40 MG tablet TAKE 1 TABLET BY MOUTH EVERY DAY 90 tablet 1    diclofenac (VOLTAREN) 75 MG EC tablet Take 1 tablet by mouth 2 (Two) Times a Day As Needed (pain). 20 tablet 0    losartan (COZAAR) 50 MG tablet       montelukast (SINGULAIR) 10 MG tablet TAKE 1 TABLET BY MOUTH DAILY 30 tablet 2    omeprazole (priLOSEC) 40 MG capsule TAKE 1 CAPSULE BY MOUTH EVERY DAY IN THE MORNING BEFORE BREAKFAST 90 capsule 1    PARoxetine (PAXIL) 40 MG tablet Take 1 tablet by mouth Every Morning. 90 tablet 1    Prenatal Multivit-Min-Fe-FA (Prenatal Forte) tablet Take 1 tablet by mouth Daily.      ProAir  (90 Base) MCG/ACT inhaler INHALE 2 PUFFS EVERY 4 (FOUR) HOURS AS NEEDED FOR WHEEZING. 8.5 g 2    rizatriptan (MAXALT) 10 MG tablet TAKE 1 TABLET BY MOUTH 1 (ONE) TIME AS NEEDED FOR MIGRAINE FOR UP TO 1 DOSE. MAY REPEAT IN 2 HOURS IF NEEDED 9 tablet 2    VITAMIN D, CHOLECALCIFEROL, PO Take  by mouth.       No current facility-administered medications for this visit.     Review of  Systems   Constitutional: Negative.    HENT: Negative.     Eyes: Negative.    Respiratory: Negative.     Cardiovascular: Negative.    Gastrointestinal: Negative.    Endocrine: Negative.    Genitourinary: Negative.    Musculoskeletal: Negative.         Left ankle pain   Skin: Negative.    Allergic/Immunologic: Negative.    Neurological: Negative.    Hematological: Negative.    Psychiatric/Behavioral: Negative.     All other systems reviewed and are negative.      OBJECTIVE     Vitals:    02/08/24 0952   BP: 125/79   Pulse: 113   Temp: 97.7 °F (36.5 °C)   SpO2: 94%       WBC   Date Value Ref Range Status   06/06/2023 9.04 3.40 - 10.80 10*3/mm3 Final     RBC   Date Value Ref Range Status   06/06/2023 3.85 3.77 - 5.28 10*6/mm3 Final     Hemoglobin   Date Value Ref Range Status   06/06/2023 11.2 (L) 12.0 - 15.9 g/dL Final     Hematocrit   Date Value Ref Range Status   06/06/2023 33.3 (L) 34.0 - 46.6 % Final     MCV   Date Value Ref Range Status   06/06/2023 86.5 79.0 - 97.0 fL Final     MCH   Date Value Ref Range Status   06/06/2023 29.1 26.6 - 33.0 pg Final     MCHC   Date Value Ref Range Status   06/06/2023 33.6 31.5 - 35.7 g/dL Final     RDW   Date Value Ref Range Status   06/06/2023 13.5 12.3 - 15.4 % Final     RDW-SD   Date Value Ref Range Status   06/06/2023 41.2 37.0 - 54.0 fl Final     MPV   Date Value Ref Range Status   06/06/2023 9.5 6.0 - 12.0 fL Final     Platelets   Date Value Ref Range Status   06/06/2023 354 140 - 450 10*3/mm3 Final     Neutrophil %   Date Value Ref Range Status   06/06/2023 77.1 (H) 42.7 - 76.0 % Final     Lymphocyte %   Date Value Ref Range Status   06/06/2023 15.4 (L) 19.6 - 45.3 % Final     Monocyte %   Date Value Ref Range Status   06/06/2023 4.9 (L) 5.0 - 12.0 % Final     Eosinophil %   Date Value Ref Range Status   06/06/2023 1.3 0.3 - 6.2 % Final     Basophil %   Date Value Ref Range Status   06/06/2023 0.6 0.0 - 1.5 % Final     Immature Grans %   Date Value Ref Range Status    06/06/2023 0.7 (H) 0.0 - 0.5 % Final     Neutrophils Absolute   Date Value Ref Range Status   06/16/2023 4.8 1.7 - 6.0 x10(3)/ul Final     Lymphocytes, Absolute   Date Value Ref Range Status   06/06/2023 1.39 0.70 - 3.10 10*3/mm3 Final     Monocytes, Absolute   Date Value Ref Range Status   06/06/2023 0.44 0.10 - 0.90 10*3/mm3 Final     Eosinophils Absolute   Date Value Ref Range Status   06/16/2023 0.2 0.0 - 0.6 x10(3)/ul Final     Basophils Absolute   Date Value Ref Range Status   06/16/2023 0.1 0.0 - 0.3 x10(3)/ul Final     Immature Grans, Absolute   Date Value Ref Range Status   06/06/2023 0.06 (H) 0.00 - 0.05 10*3/mm3 Final     nRBC   Date Value Ref Range Status   06/06/2023 0.1 0.0 - 0.2 /100 WBC Final         Lab Results   Component Value Date    GLUCOSE 118 (H) 06/06/2023    BUN 15 06/06/2023    CREATININE 0.88 06/06/2023    EGFRIFNONA 54 (L) 12/28/2021    BCR 17.0 06/06/2023    K 4.1 06/06/2023    CO2 25.0 06/06/2023    CALCIUM 8.9 06/06/2023    ALBUMIN 4.00 10/19/2022    LABIL2 1.1 (L) 04/19/2021    AST 61 (H) 10/19/2022    ALT 62 (H) 10/19/2022       Patient seen in no apparent distress.      PHYSICAL EXAM:     Foot/Ankle Exam    GENERAL  Appearance:  appears stated age  Orientation:  AAOx3  Affect:  appropriate  Gait:  unimpaired  Assistance:  independent  Right shoe gear: casual shoe  Left shoe gear: casual shoe    VASCULAR     Right Foot Vascularity   Normal vascular exam    Dorsalis pedis:  2+  Posterior tibial:  2+  Skin temperature:  warm  Edema grading:  None  CFT:  < 3 seconds  Pedal hair growth:  Present  Varicosities:  none     Left Foot Vascularity   Normal vascular exam    Dorsalis pedis:  2+  Posterior tibial:  2+  Skin temperature:  warm  Edema grading:  None  CFT:  < 3 seconds  Pedal hair growth:  Present  Varicosities:  none     NEUROLOGIC     Right Foot Neurologic   Normal sensation    Light touch sensation: normal  Vibratory sensation: normal  Hot/Cold sensation: normal  Protective  Sensation using Yolyn-Vahe Monofilament:   Sites intact: 10  Sites tested: 10     Left Foot Neurologic   Normal sensation    Light touch sensation: normal  Vibratory sensation: normal  Hot/Cold sensation:  normal  Protective Sensation using Yolyn-Vahe Monofilament:   Sites intact: 10  Sites tested: 10    MUSCULOSKELETAL     Left Foot Musculoskeletal   Tenderness:  anterior talofibular ligament tenderness and lateral malleolus tenderness    MUSCLE STRENGTH     Right Foot Muscle Strength   Foot dorsiflexion:  4  Foot plantar flexion:  4  Foot inversion:  4  Foot eversion:  4     Left Foot Muscle Strength   Foot dorsiflexion:  4  Foot plantar flexion:  4  Foot inversion:  4  Foot eversion:  4    RANGE OF MOTION     Right Foot Range of Motion   Foot and ankle ROM within normal limits       Left Foot Range of Motion   Foot and ankle ROM within normal limits    Ankle dorsiflexion: with pain  Ankle plantar flexion: with pain  Foot eversion: with pain  Foot inversion: with pain    DERMATOLOGIC      Right Foot Dermatologic   Skin  Right foot skin is intact.      Left Foot Dermatologic   Skin  Left foot skin is intact.       RADIOLOGY:          XR Foot 3+ View Left    Result Date: 2/7/2024  Narrative: PROCEDURE: XR FOOT 3+ VW LEFT  COMPARISON: None  INDICATIONS: Left foot pain, swelling and bruising after fall on 2/5/24.  FINDINGS:  No fractures are visualized.  Mild degenerative change consistent with osteoarthritis is seen in the tarsal region and 1st MTP joint.  Healed fracture of the distal 2nd metatarsal is evident.  Screws are seen in the distal 3rd and 4th metatarsals.  No heel spurs are seen.  No foreign body is evident.      Impression:   Left foot series demonstrating no acute bony abnormality.      MARGA RUBI MD       Electronically Signed and Approved By: MARGA RUBI MD on 2/07/2024 at 20:21             XR Ankle 3+ View Left    Result Date: 2/7/2024  Narrative: PROCEDURE: XR ANKLE 3+ VW LEFT   COMPARISON: None  INDICATIONS: Left ankle pain, swelling and bruising after fall on 2/5/24.  FINDINGS:  Nondisplaced fracture of the tip of the lateral malleolus is evident.  Soft tissue swelling is evident.  Fluid or blood is seen in the tibiotalar joint.  Mild degenerative change consistent with osteoarthritis is seen in the tibiotalar joint.      Impression:   Left ankle series demonstrating nondisplaced fracture of the tip of the lateral malleolus.  Soft tissue swelling and joint fluid or blood.      MARGA RUBI MD       Electronically Signed and Approved By: MARGA RUBI MD on 2/07/2024 at 20:20              ASSESSMENT/PLAN     Diagnoses and all orders for this visit:    1. Closed nondisplaced fracture of lateral malleolus of left fibula, initial encounter (Primary)    2. Rupture of ligament of ankle, left, initial encounter      Partial weightbearing in boot.    Patient to begin stretching exercises and icing in the evening as tolerated. Discussed compression therapy and resting the extremity.  Anti-inflammatory medication to begin taking if okay by PCP.    Return to clinic in 1 month.    Comprehensive lower extremity examination and evaluation was performed.    Discussed findings and treatment plan including risks, benefits, and treatment options with patient in detail. Patient agreed with treatment plan.    Medications and allergies reviewed.  Reviewed available lab values along with other pertinent labs.  These were discussed with the patient.    An After Visit Summary was printed and given to the patient at discharge, including (if requested) any available informative/educational handouts regarding diagnosis, treatment, or medications. All questions were answered to patient/family satisfaction. Should symptoms fail to improve or worsen they agree to call or return to clinic or to go to the Emergency Department. Discussed the importance of following up with any needed screening tests/labs/specialist  appointments and any requested follow-up recommended by me today. Importance of maintaining follow-up discussed and patient accepts that missed appointments can delay diagnosis and potentially lead to worsening of conditions.    Return in about 1 month (around 3/8/2024)., or sooner if acute issues arise.    This document has been electronically signed by Quan Chahal DPM on February 9, 2024 07:26 EST         "surgery today anxiety"

## 2024-02-26 ENCOUNTER — APPOINTMENT (OUTPATIENT)
Dept: ULTRASOUND IMAGING | Facility: CLINIC | Age: 79
End: 2024-02-26
Payer: MEDICARE

## 2024-02-26 PROCEDURE — 76830 TRANSVAGINAL US NON-OB: CPT

## 2025-01-06 NOTE — BRIEF OPERATIVE NOTE - NSICDXBRIEFPREOP_GEN_ALL_CORE_FT
PHYSICAL / OCCUPATIONAL THERAPY - DAILY TREATMENT NOTE (updated )    Patient Name: Temo Churchill    Date: 2025    : 1972  Ins[facility] / Plan: [facility] CARDINAL CARE / Product Type: *No Product type* /      Patient  verified Yes     Visit #   Current / Total 2 10   Time   In / Out 12:20 1:00   Pain   In / Out 5/10 R knee 0/10   Subjective Functional Status/Changes: \"I still can't get a shoe on my right foot.\"     TREATMENT AREA =  Gait instability [R26.81]    OBJECTIVE      Therapeutic Procedures:  Tx Min  Procedure, Rationale, Specifics   16  [id number] Therapeutic Exercise (timed):  increase ROM, strength, coordination, balance, and proprioception to improve patient's ability to progress to PLOF and address remaining functional goals. (see flow sheet as applicable)     Details if applicable:  LE strengthening     8  94835 Neuromuscular Re-Education (timed):  improve balance, coordination, kinesthetic sense, posture, core stability and proprioception to improve patient's ability to develop conscious control of individual muscles and awareness of position of extremities in order to progress to PLOF and address remaining functional goals. (see flow sheet as applicable)     Details if applicable:  quad and glute re-ed     8  75392 Therapeutic Activity (timed):  use of dynamic activities replicating functional movements to increase ROM, strength, coordination, balance, and proprioception in order to improve patient's ability to progress to PLOF and address remaining functional goals.  (see flow sheet as applicable)     Details if applicable:  sit to stands     8  28391 Gait Training (timed):    0 feet with WC follow in parallel bars (assistive device) over level surfaces with supervision level of assist. Cuing for upright posture.  To improve safety and dynamic movement with household/community ambulation.  (see flow sheet as applicable)     Details if applicable:   PRE-OP DIAGNOSIS:  Breast cancer 10-Albert-2020 08:56:42 right intraductal ca Angela Foley